# Patient Record
Sex: FEMALE | Employment: OTHER | URBAN - METROPOLITAN AREA
[De-identification: names, ages, dates, MRNs, and addresses within clinical notes are randomized per-mention and may not be internally consistent; named-entity substitution may affect disease eponyms.]

---

## 2020-10-28 ENCOUNTER — TRANSFERRED RECORDS (OUTPATIENT)
Dept: HEALTH INFORMATION MANAGEMENT | Facility: CLINIC | Age: 75
End: 2020-10-28

## 2020-11-19 ENCOUNTER — VIRTUAL VISIT (OUTPATIENT)
Dept: OTOLARYNGOLOGY | Facility: CLINIC | Age: 75
End: 2020-11-19
Payer: MEDICARE

## 2020-11-19 DIAGNOSIS — C44.310 BCC (BASAL CELL CARCINOMA), FACE: Primary | ICD-10-CM

## 2020-11-19 PROCEDURE — 99203 OFFICE O/P NEW LOW 30 MIN: CPT | Mod: 95 | Performed by: OTOLARYNGOLOGY

## 2020-11-19 RX ORDER — MULTIPLE VITAMINS W/ MINERALS TAB 9MG-400MCG
1 TAB ORAL
COMMUNITY

## 2020-11-19 RX ORDER — TRAVOPROST OPHTHALMIC SOLUTION 0.04 MG/ML
1 SOLUTION OPHTHALMIC
COMMUNITY

## 2020-11-19 RX ORDER — POTASSIUM CHLORIDE 1500 MG/1
20 TABLET, EXTENDED RELEASE ORAL
COMMUNITY

## 2020-11-19 RX ORDER — SIMVASTATIN 40 MG
40 TABLET ORAL
COMMUNITY

## 2020-11-19 RX ORDER — ALPRAZOLAM 0.25 MG
0.25 TABLET ORAL
COMMUNITY

## 2020-11-19 RX ORDER — CLOPIDOGREL BISULFATE 75 MG/1
75 TABLET ORAL
COMMUNITY

## 2020-11-19 RX ORDER — DOXYCYCLINE 50 MG/1
CAPSULE ORAL
COMMUNITY
Start: 2020-10-28

## 2020-11-19 RX ORDER — TOLTERODINE TARTRATE 2 MG/1
2 TABLET, EXTENDED RELEASE ORAL 2 TIMES DAILY
COMMUNITY

## 2020-11-19 RX ORDER — GABAPENTIN 100 MG/1
200 CAPSULE ORAL
COMMUNITY

## 2020-11-19 RX ORDER — METOPROLOL SUCCINATE 25 MG/1
25 TABLET, EXTENDED RELEASE ORAL
COMMUNITY

## 2020-11-19 NOTE — LETTER
11/19/2020         RE: Nayeli Hutchison  [address]  [facility] [address]        Dear Colleague,    Thank you for referring your patient, Nayeli Hutchison, to the [facility]. Please see a copy of my visit note below.    Tal Vargas MD    Dear Doctor Alicia,    Thank you for asking me to see your patient, Ms. Nayeli Hutchison, in consultation to evaluate her anticipated left cheek defect after Mohs surgery.  Today I had the pleasure of seeing her at the Facial Plastic and Re[facility]ive [facility] Clinic Otolaryngology at [facility].    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     Comorbidities: Stroke 2008.  Pertinent medications: Plavix  Photographs: No photos taken.  Care Checklist:    _Schedule Mohs surgery with surgical reconstruction afterwards.   _She prefers reconstruction in the OR under sedation. Plan for stage 1 reconstruction with wound preparation, complex closure or local flaps. She has given me permission to finalize the reconstructive plan in the preoperative area.   _COVID test prior to surgery. May need alternative COVID testing time since she may be unavailable during her usual testing time due to her Mohs surgery.  _Anticoagulation plan for Plavix.          MEDICAL DECISION MAKING:    Anticipated left cheek defect after Mohs surgery. We will finalize the reconstructive plan after her Mohs surgery. She understands that she will have a hole in her cheek during the delay period. We discussed the reconstructive options of healing by secondary intention, primary closure, skin grafting, and local flap reconstruction. The patient understands the reconstructive plan cannot be finalized until the size, depth, and contents of the defect are known.     We have initiated surgery coordination. I look forward to seeing her on her surgery day. It has been a pleasure to participate in the care of Ms. Hutchison.  Thank you for this kind referral.      Sincerely,    Nathaniel Mccormack MD    Division of Facial Plastic and Reconstructive Surgery,   Department of Otolaryngology  [facility]   ____________________________________________________      HISTORY OF PRESENT ILLNESS and RECONSTRUCTIVE QUESTIONAAIRE:  As you know, Ms. Hutchison is an 75 year old-year-old female who has been referred to discuss reconstruction of an anticipated facial defect.      Had previous skin cancer of her nose that reconstructed with a single stage forehead flap about 5 years ago.     Dr. Vargas's office sent a photo of the biopsy site and it is in the central aspect of the left malar cheek.     How would you describe the site on your face or neck with the cancer? Sore that would bleed and not heal.   When did you first notice the lesion or growth? Pt states she does not really know   Has the lesion grown? No.     Has this area been biopsied? Yes.   Provider- Has this area been biopsied? See clinical summary.     Has this lesion been treated before? What treatment? none.   Provider- Has this lesion been treated before? What treatment? none.     What treatment has been recommended? MOHS .   Provider- What treatment has been recommended? Mohs.     Pain: none.  Provider- Pain: none.    Ulceration: non-healing sore.   Provider- Ulceration: non-healing sore.     Bleeding: it would bleed off and on.   Color change: none.   Purulence: none.   Oozing: none.    History of rupture: none.     For lesions near the nose:   No: History of nasal congestion?  No: History of allergies?  No: Allergic symptoms?  Yes -: History of prior nasal surgery?  No: History of nasal trauma?  No: History of nasal medications?    Have you had a lot of sun exposure in the past? Yes  Do you remember blistering sunburns anywhere on your body? No  Have you used a tanning bed in the past? No    Do you currently smoke? No  Provider- Do you currently smoke?  No  Have you smoked in the past? Yes  No: Have you had radiation to your head or neck in the past?   No: Have you had a prior burn injury to the face?   Yes - BCC: Have you had previous skin cancers? Yes      REVIEW OF SYSTEMS: a 12 system review was performed by the patient care staff:    Do you currently have or have you ever had in the past:    No: Complications with sedation or anesthesia.  Yes - Plavix: Use blood thinners. Stroke in 2008.   No: Any heart problems.   No: Chest pain.   No: A pacemaker.  No: Problems with excessive bleeding or a bleeding disorder.  Yes had stroke 2008: Problems with blood clots or a clotting disorder.   No: Sleep apnea or sleep with a CPAP machine.       No: Excessive scarring.   No: Night sweats.   No: Fevers.   No: Double vision.   No: Vision loss.   No: Snoring.   No: Difficulty breathing through your nose.   No: Runny nose.   No: Sneezing.   No: Itchy eyes.   No: Itchy throat.   No: Face pain.   No: Face weakness.   No: Face numbness.   No: Difficulty swallowing.   No: Pain with swallowing.,   No: Difficulty hearing or hearing loss.   Bladder problems recently, tolteradine: Difficulty urinating.   No: Anxiety.   No: Depression.     FAMILY HISTORY:  Yes - mother  of blood clot to her brain, had hemmorrhage: Father also had several surgeries of legs and veins. Family history of excessive bleeding or a bleeding disorder.    Yes: Family history of blood clots or a clotting disorder.    No family history on file.    PAST MEDICAL HISTORY: Stroke in .     PAST SURGICAL HISTORY: Forehead flap. Cholecystectomy.    SOCIAL HISTORY:   Social History     Tobacco Use     Smoking status: Not on file   Substance Use Topics     Alcohol use: Not on file       ALLERGIES: Morphine, Atorvastatin, and Enalapril    MEDICATIONS:   Current Outpatient Medications   Medication Sig Dispense Refill     ALPRAZolam (XANAX) 0.25 MG tablet Take 0.25 mg by mouth       clopidogrel (PLAVIX) 75 MG tablet  Take 75 mg by mouth       doxycycline monohydrate (MONODOX) 50 MG capsule TK 1 C PO D       gabapentin (NEURONTIN) 100 MG capsule Take 200 mg by mouth       metoprolol succinate ER (TOPROL-XL) 25 MG 24 hr tablet Take 25 mg by mouth       multivitamin w/minerals (MULTI-VITAMIN) tablet Take 1 tablet by mouth       potassium chloride ER (KLOR-CON M) 20 MEQ CR tablet Take 20 mEq by mouth       simvastatin (ZOCOR) 40 MG tablet Take 40 mg by mouth       tolterodine (DETROL) 2 MG tablet Take 2 mg by mouth 2 times daily       travoprost GWEN FREE (TRAVATAN Z) 0.004 % ophthalmic solution 1 drop         Lesion size measured on the patient in centimeters (check OR report or Mohs report): 5 x 4 mm      Patient Care Staff Signature: Samra Avina LPN    ______________________________________________    Provider- Review of systems, FH, PMH, PSH, SH, ALL, and Medications taken by the patient care staff was reviewed by me: Nathaniel Mccormack MD        Provider- PREPROCEDURE COUNSELING: An extensive preoperative discussion was held.  The patient stated she understood the risks, benefits, alternatives and limitations of the procedure.     Provider-: PREOPERATIVE COUNSELING for RECONSTRUCTION: An extensive preoperative discussion was held. The patient stated she understood the risks, benefits, alternatives and limitations of the procedure. The risks including but not limited to bleeding, infection, damage to surrounding structures, numbness, weakness, cancer recurrence, chronic pain, poor aesthetic result, partial or total skin loss, distortion of surrounding facial structures, and unforeseen complications related to surgery or anesthesia were described. I emphasized the risks of partial or total skin loss at the surgical sites. She also understands the possibility of distortion of surrounding facial structures including her eyelid margin which may result in ectropion. She understands that more skin may need to be excised during the  "reconstruction. The patient understands that reconstruction may be delayed to allow permanent section review of the specimen to assure negative margins or to adjust the reconstructive plan.  We discussed how additional surgeries may be needed to obtain an optimal result. I described how no reconstructive effort will be able to restore her to her exact precancer condition. We also acknowledged that facial asymmetries will be present after the reconstruction.    We discussed how the patient's anticoagluation may result in bleeding complications.    The patient stated she understood these risks and limitations and elects to proceed with surgery.  She also stated she had her questions answered to her satisfaction.      Nayeli Hutchison is a 75 year old female who is being evaluated via a billable telephone visit.      The patient has been notified of following:     \"This telephone visit will be conducted via a call between you and your physician/provider. We have found that certain health care needs can be provided without the need for a physical exam.  This service lets us provide the care you need with a short phone conversation.  If a prescription is necessary we can send it directly to your pharmacy.  If lab work is needed we can place an order for that and you can then stop by our lab to have the test done at a later time.    Telephone visits are billed at different rates depending on your insurance coverage. During this emergency period, for some insurers they may be billed the same as an in-person visit.  Please reach out to your insurance provider with any questions.    If during the course of the call the physician/provider feels a telephone visit is not appropriate, you will not be charged for this service.\"    Patient has given verbal consent for Telephone visit?  Yes    What phone number would you like to be contacted at? [phone]    Phone call duration: [phone]) 31 minutes    Nathaniel Mccormack, " MD          Again, thank you for allowing me to participate in the care of your patient.        Sincerely,        Nathaniel Mccormack MD

## 2020-11-19 NOTE — PROGRESS NOTES
Tal Vargas MD    Dear Doctor Alicia,    Thank you for asking me to see your patient, Ms. Nayeli Hutchison, in consultation to evaluate her anticipated left cheek defect after Mohs surgery.  Today I had the pleasure of seeing her at the Facial Plastic and Reconstructive Surgery Clinic Otolaryngology at [facility].    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     Comorbidities: Stroke 2008.  Pertinent medications: Plavix  Photographs: No photos taken.  Care Checklist:    _Schedule Mohs surgery with surgical reconstruction afterwards.   _She prefers reconstruction in the OR under sedation. Plan for stage 1 reconstruction with wound preparation, complex closure or local flaps. She has given me permission to finalize the reconstructive plan in the preoperative area.   _COVID test prior to surgery. May need alternative COVID testing time since she may be unavailable during her usual testing time due to her Mohs surgery.  _Anticoagulation plan for Plavix.          MEDICAL DECISION MAKING:    Anticipated left cheek defect after Mohs surgery. We will finalize the reconstructive plan after her Mohs surgery. She understands that she will have a hole in her cheek during the delay period. We discussed the reconstructive options of healing by secondary intention, primary closure, skin grafting, and local flap reconstruction. The patient understands the reconstructive plan cannot be finalized until the size, depth, and contents of the defect are known.     We have initiated surgery coordination. I look forward to seeing her on her surgery day. It has been a pleasure to participate in the care of Ms. Hutchison. Thank you for this kind referral.      Sincerely,    Nathaniel Mccormack MD    Division of Facial Plastic and Reconstructive Surgery,   Department of Otolaryngology  [facility]    ____________________________________________________      HISTORY OF PRESENT ILLNESS and RECONSTRUCTIVE QUESTIONAAIRE:  As you know, Ms. Hutchison is an 75 year old-year-old female who has been referred to discuss reconstruction of an anticipated facial defect.      Had previous skin cancer of her nose that reconstructed with a single stage forehead flap about 5 years ago.     Dr. Vargas's office sent a photo of the biopsy site and it is in the central aspect of the left malar cheek.     How would you describe the site on your face or neck with the cancer? Sore that would bleed and not heal.   When did you first notice the lesion or growth? Pt states she does not really know   Has the lesion grown? No.     Has this area been biopsied? Yes.   Provider- Has this area been biopsied? See clinical summary.     Has this lesion been treated before? What treatment? none.   Provider- Has this lesion been treated before? What treatment? none.     What treatment has been recommended? MOHS .   Provider- What treatment has been recommended? Mohs.     Pain: none.  Provider- Pain: none.    Ulceration: non-healing sore.   Provider- Ulceration: non-healing sore.     Bleeding: it would bleed off and on.   Color change: none.   Purulence: none.   Oozing: none.    History of rupture: none.     For lesions near the nose:   No: History of nasal congestion?  No: History of allergies?  No: Allergic symptoms?  Yes -: History of prior nasal surgery?  No: History of nasal trauma?  No: History of nasal medications?    Have you had a lot of sun exposure in the past? Yes  Do you remember blistering sunburns anywhere on your body? No  Have you used a tanning bed in the past? No    Do you currently smoke? No  Provider- Do you currently smoke? No  Have you smoked in the past? Yes  No: Have you had radiation to your head or neck in the past?   No: Have you had a prior burn injury to the face?   Yes - BCC: Have you had previous skin cancers?  Yes      REVIEW OF SYSTEMS: a 12 system review was performed by the patient care staff:    Do you currently have or have you ever had in the past:    No: Complications with sedation or anesthesia.  Yes - Plavix: Use blood thinners. Stroke in .   No: Any heart problems.   No: Chest pain.   No: A pacemaker.  No: Problems with excessive bleeding or a bleeding disorder.  Yes had stroke 2008: Problems with blood clots or a clotting disorder.   No: Sleep apnea or sleep with a CPAP machine.       No: Excessive scarring.   No: Night sweats.   No: Fevers.   No: Double vision.   No: Vision loss.   No: Snoring.   No: Difficulty breathing through your nose.   No: Runny nose.   No: Sneezing.   No: Itchy eyes.   No: Itchy throat.   No: Face pain.   No: Face weakness.   No: Face numbness.   No: Difficulty swallowing.   No: Pain with swallowing.,   No: Difficulty hearing or hearing loss.   Bladder problems recently, tolteradine: Difficulty urinating.   No: Anxiety.   No: Depression.     FAMILY HISTORY:  Yes - mother  of blood clot to her brain, had hemmorrhage: Father also had several surgeries of legs and veins. Family history of excessive bleeding or a bleeding disorder.    Yes: Family history of blood clots or a clotting disorder.    No family history on file.    PAST MEDICAL HISTORY: Stroke in .     PAST SURGICAL HISTORY: Forehead flap. Cholecystectomy.    SOCIAL HISTORY:   Social History     Tobacco Use     Smoking status: Not on file   Substance Use Topics     Alcohol use: Not on file       ALLERGIES: Morphine, Atorvastatin, and Enalapril    MEDICATIONS:   Current Outpatient Medications   Medication Sig Dispense Refill     ALPRAZolam (XANAX) 0.25 MG tablet Take 0.25 mg by mouth       clopidogrel (PLAVIX) 75 MG tablet Take 75 mg by mouth       doxycycline monohydrate (MONODOX) 50 MG capsule TK 1 C PO D       gabapentin (NEURONTIN) 100 MG capsule Take 200 mg by mouth       metoprolol succinate ER (TOPROL-XL) 25 MG  24 hr tablet Take 25 mg by mouth       multivitamin w/minerals (MULTI-VITAMIN) tablet Take 1 tablet by mouth       potassium chloride ER (KLOR-CON M) 20 MEQ CR tablet Take 20 mEq by mouth       simvastatin (ZOCOR) 40 MG tablet Take 40 mg by mouth       tolterodine (DETROL) 2 MG tablet Take 2 mg by mouth 2 times daily       travoprost GWEN FREE (TRAVATAN Z) 0.004 % ophthalmic solution 1 drop         Lesion size measured on the patient in centimeters (check OR report or Mohs report): 5 x 4 mm      Patient Care Staff Signature: Samra Avina LPN    ______________________________________________    Provider- Review of systems, FH, PMH, PSH, SH, ALL, and Medications taken by the patient care staff was reviewed by me: Nathaniel Mccormack MD        Provider- PREPROCEDURE COUNSELING: An extensive preoperative discussion was held.  The patient stated she understood the risks, benefits, alternatives and limitations of the procedure.     Provider-: PREOPERATIVE COUNSELING for RECONSTRUCTION: An extensive preoperative discussion was held. The patient stated she understood the risks, benefits, alternatives and limitations of the procedure. The risks including but not limited to bleeding, infection, damage to surrounding structures, numbness, weakness, cancer recurrence, chronic pain, poor aesthetic result, partial or total skin loss, distortion of surrounding facial structures, and unforeseen complications related to surgery or anesthesia were described. I emphasized the risks of partial or total skin loss at the surgical sites. She also understands the possibility of distortion of surrounding facial structures including her eyelid margin which may result in ectropion. She understands that more skin may need to be excised during the reconstruction. The patient understands that reconstruction may be delayed to allow permanent section review of the specimen to assure negative margins or to adjust the reconstructive plan.  We  "discussed how additional surgeries may be needed to obtain an optimal result. I described how no reconstructive effort will be able to restore her to her exact precancer condition. We also acknowledged that facial asymmetries will be present after the reconstruction.    We discussed how the patient's anticoagluation may result in bleeding complications.    The patient stated she understood these risks and limitations and elects to proceed with surgery.  She also stated she had her questions answered to her satisfaction.      Nayeli Hutchison is a 75 year old female who is being evaluated via a billable telephone visit.      The patient has been notified of following:     \"This telephone visit will be conducted via a call between you and your physician/provider. We have found that certain health care needs can be provided without the need for a physical exam.  This service lets us provide the care you need with a short phone conversation.  If a prescription is necessary we can send it directly to your pharmacy.  If lab work is needed we can place an order for that and you can then stop by our lab to have the test done at a later time.    Telephone visits are billed at different rates depending on your insurance coverage. During this emergency period, for some insurers they may be billed the same as an in-person visit.  Please reach out to your insurance provider with any questions.    If during the course of the call the physician/provider feels a telephone visit is not appropriate, you will not be charged for this service.\"    Patient has given verbal consent for Telephone visit?  Yes    What phone number would you like to be contacted at? [phone]    Phone call duration: ([phone]) 31 minutes    Nathaniel Mccormack MD      "

## 2020-11-19 NOTE — NURSING NOTE
Nayeli Hutchison's goals for this visit include:   Chief Complaint   Patient presents with     Consult     MOHS discussion      She requests these members of her care team be copied on today's visit information:     PCP: No Ref-Primary, Physician    Referring Provider:  No referring provider defined for this encounter.    There were no vitals taken for this visit.    Do you need any medication refills at today's visit? No    Samra Avina LPN

## 2020-11-19 NOTE — PATIENT INSTRUCTIONS
Reconstruction after Mohs surgery. You will have a wound on your face from the time you have Mohs surgery until Dr. Mccormack repairs the wound. We discussed the reconstructive options of healing by secondary intention, skin grafting, and local flap reconstruction. The reconstructive plan cannot be finalized until the size, depth, and contents of the defect are known.    Please contact us if you have questions or if we can be of service.

## 2020-12-09 ENCOUNTER — OFFICE VISIT (OUTPATIENT)
Dept: OTOLARYNGOLOGY | Facility: CLINIC | Age: 75
End: 2020-12-09
Payer: MEDICARE

## 2020-12-09 VITALS — DIASTOLIC BLOOD PRESSURE: 70 MMHG | SYSTOLIC BLOOD PRESSURE: 165 MMHG | HEART RATE: 74 BPM

## 2020-12-09 DIAGNOSIS — Z98.890 MOHS DEFECT OF CHEEK: Primary | ICD-10-CM

## 2020-12-09 DIAGNOSIS — M95.2 MOHS DEFECT OF CHEEK: Primary | ICD-10-CM

## 2020-12-09 PROCEDURE — 99024 POSTOP FOLLOW-UP VISIT: CPT | Performed by: OTOLARYNGOLOGY

## 2020-12-09 ASSESSMENT — PAIN SCALES - GENERAL: PAINLEVEL: NO PAIN (0)

## 2020-12-09 NOTE — NURSING NOTE
Nayeli Hutchison's goals for this visit include:   Chief Complaint   Patient presents with     Wound Check     1 week postop  left cheek reconstruction       She requests these members of her care team be copied on today's visit information:     PCP: No Ref-Primary, Physician    Referring Provider:  No referring provider defined for this encounter.    There were no vitals taken for this visit.    Do you need any medication refills at today's visit? No    Carmela Jeffery RN

## 2020-12-09 NOTE — LETTER
12/9/2020         RE: Nayeli Hutchison  [address]  [facility] [address]        Dear Colleague,    Thank you for referring your patient, Nayeli Hutchison, to the [facility]. Please see a copy of my visit note below.    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   -12/1/20: stage 1- complex closure (path = benign skin)    Comorbidities: Stroke 2008.  Pertinent medications: Plavix  Photographs: No photos taken.  Care Checklist:   _Anticoagulation plan for Plavix. Stopped on 11/24/20. Restart 48 hours after surgery. Update 12/9/20: restarted Plavix on 12/3/20 without any complications.   _RTC 6-8 weeks.             Collection Date:          12/1/2020 09:46 CST      Ordering Physician:       JOSELITO SEGURA JR  Received Date:            12/1/2020 10:58 CST      Accession Number:         [id number]                                        Surgical Pathology Final Report  Specimen Type:  A.  Skin, left cheek inferior standing cone, excision  B.  Skin, left cheek superior standing cone, excision      Final Diagnosis:  A.  Skin, left cheek inferior standing cone, excision - Benign skin with scar and chronic  inflammation.    B.  Skin, left cheek superior standing cone, excision - Benign skin with scar and chronic  inflammation.  *  Report Electronically Signed By  *    Daniel Cool M.D.    12.02.2020 16:06    Facial Plastic and Reconstructive Surgery Note    Today I had the pleasure of seeing the patient at the Facial Plastic and Reconstructive Surgery Clinic in the Department of Otolaryngology at [facility]. The patient underwent reconstruction last week. Pain = 0/10. Pain is appropriately controlled and decreasing. No bleeding or discharge from the wound.    On examination, the patient is in no apparent distress, no stridor, voice is strong.   All reconstructed tissue is viable with adequate color and capillary refill. Sutures were  removed. Incisions are clean, dry, and intact. No evidence of hematoma or infection.    Pathology from the excess skin excised during the reconstruction showed no evidence of malignancy.    Impression and Recommendations: status post stage 1 reconstruction last week. The patient is recovering well and the reconstruction appears viable. Continue with careful wound care to maintain wound moisture and promote healing. The patient can shower and get the reconstruction site wet but should avoid scrubbing. We discussed the importance of sun protection especially at the reconstruction site. Follow up in 6-8 weeks for monitoring. I encouraged the patient to call or return sooner if questions or problems arise. Nathaniel Mccormack MD          Again, thank you for allowing me to participate in the care of your patient.        Sincerely,        Nathaniel Mccormack MD

## 2020-12-09 NOTE — PROGRESS NOTES
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   -12/1/20: stage 1- complex closure (path = benign skin)    Comorbidities: Stroke 2008.  Pertinent medications: Plavix  Photographs: No photos taken.  Care Checklist:   _Anticoagulation plan for Plavix. Stopped on 11/24/20. Restart 48 hours after surgery. Update 12/9/20: restarted Plavix on 12/3/20 without any complications.   _RTC 6-8 weeks.             Collection Date:          12/1/2020 09:46 CST      Ordering Physician:       JOSELITO SEGURA JR  Received Date:            12/1/2020 10:58 CST      Accession Number:         [id number]                                        Surgical Pathology Final Report  Specimen Type:  A.  Skin, left cheek inferior standing cone, excision  B.  Skin, left cheek superior standing cone, excision      Final Diagnosis:  A.  Skin, left cheek inferior standing cone, excision - Benign skin with scar and chronic  inflammation.    B.  Skin, left cheek superior standing cone, excision - Benign skin with scar and chronic  inflammation.  *  Report Electronically Signed By  *    Daniel Cool M.D.    12.02.2020 16:06    Facial Plastic and Reconstructive Surgery Note    Today I had the pleasure of seeing the patient at the [facility] Clinic in the Department of Otolaryngology at [facility]. The patient underwent reconstruction last week. Pain = 0/10. Pain is appropriately controlled and decreasing. No bleeding or discharge from the wound.    On examination, the patient is in no apparent distress, no stridor, voice is strong.   All reconstructed tissue is viable with adequate color and capillary refill. Sutures were removed. Incisions are clean, dry, and intact. No evidence of hematoma or infection.    Pathology from the excess skin excised during the reconstruction showed no evidence of malignancy.    Impression and Recommendations: status post stage 1  reconstruction last week. The patient is recovering well and the reconstruction appears viable. Continue with careful wound care to maintain wound moisture and promote healing. The patient can shower and get the reconstruction site wet but should avoid scrubbing. We discussed the importance of sun protection especially at the reconstruction site. Follow up in 6-8 weeks for monitoring. I encouraged the patient to call or return sooner if questions or problems arise. Nathaniel Mccormack MD

## 2024-01-01 ENCOUNTER — LAB REQUISITION (OUTPATIENT)
Dept: LAB | Facility: CLINIC | Age: 79
End: 2024-01-01
Payer: MEDICARE

## 2024-01-01 DIAGNOSIS — R41.0 DISORIENTATION, UNSPECIFIED: ICD-10-CM
